# Patient Record
(demographics unavailable — no encounter records)

---

## 2025-01-12 NOTE — DISCUSSION/SUMMARY
[FreeTextEntry1] : 14 mo old female here for sick visit Likely has a mild viral respiratory illness PE reassuring, toddler is feeding and voiding well, afebrile Recommend supportive care including antipyretics if needed, fluids as tolerated, monitoring PO and UOP, may use nasal saline followed by nasal suction when needed especially prior to feeds and sleeping. Will send nebulizer with saline to be used as needed up to 3 times daily for nasal congestion and cough. Return if symptoms worsen or persist.

## 2025-01-12 NOTE — REVIEW OF SYSTEMS
[Fever] : fever [Irritable] : no irritability [Inconsolable] : consolable [Fussy] : fussy [Crying] : crying [Eye Redness] : no eye redness [Ear Tugging] : no ear tugging [Nasal Discharge] : nasal discharge [Nasal Congestion] : nasal congestion [Tachypnea] : not tachypneic [Wheezing] : no wheezing [Cough] : cough [Appetite Changes] : appetite changes [Intolerance to feeds] : tolerance to feeds [Vomiting] : no vomiting [Diarrhea] : no diarrhea [Negative] : Cardiovascular

## 2025-01-12 NOTE — PHYSICAL EXAM
[Erythema] : erythema [Bulging] : not bulging [Clear Rhinorrhea] : clear rhinorrhea [Erythematous Oropharynx] : nonerythematous oropharynx [Transmitted Upper Airway Sounds] : transmitted upper airway sounds [NL] : warm, clear

## 2025-01-12 NOTE — HISTORY OF PRESENT ILLNESS
[FreeTextEntry6] : 14 mo old female with cough and fever Developed runny nose and congestion 1 week ago Had fever a few days ago, has been afebrile for 24 hours Continues to have nasal congestion and worsening cough at night She has decreased appetite but tolerating fluids Denies rapid breathing or increased WOB